# Patient Record
Sex: FEMALE | Race: WHITE
[De-identification: names, ages, dates, MRNs, and addresses within clinical notes are randomized per-mention and may not be internally consistent; named-entity substitution may affect disease eponyms.]

---

## 2019-04-03 ENCOUNTER — HOSPITAL ENCOUNTER (EMERGENCY)
Dept: HOSPITAL 10 - FTE | Age: 54
Discharge: HOME | End: 2019-04-03
Payer: SELF-PAY

## 2019-04-03 ENCOUNTER — HOSPITAL ENCOUNTER (EMERGENCY)
Dept: HOSPITAL 91 - FTE | Age: 54
Discharge: HOME | End: 2019-04-03
Payer: SELF-PAY

## 2019-04-03 VITALS
WEIGHT: 127.27 LBS | HEIGHT: 69 IN | HEIGHT: 69 IN | BODY MASS INDEX: 18.85 KG/M2 | BODY MASS INDEX: 18.85 KG/M2 | WEIGHT: 127.27 LBS

## 2019-04-03 VITALS — DIASTOLIC BLOOD PRESSURE: 64 MMHG | RESPIRATION RATE: 19 BRPM | HEART RATE: 86 BPM | SYSTOLIC BLOOD PRESSURE: 116 MMHG

## 2019-04-03 DIAGNOSIS — R51: Primary | ICD-10-CM

## 2019-04-03 PROCEDURE — 99282 EMERGENCY DEPT VISIT SF MDM: CPT

## 2019-04-03 NOTE — ERD
ER Documentation


Chief Complaint


Chief Complaint





body pains and migraine headaches





HPI


54-year-old female presents the emergency department complaining of chronic 


pain.


Patient as per her cures report demonstrates a significant evidence of narcotic 


and nonnarcotic controlled substance use.  Patient states she has fibromyalgia 


with chronic pain but reports no acute trauma or other acute symptoms.





ROS


All systems reviewed and are negative except as per history of present illness.





PMhx/Soc


Hx Alcohol Use:  No


Hx Substance Use:  No


Hx Tobacco Use:  No


Smoking Status:  Never smoker





Physical Exam


Vitals





Vital Signs


  Date      Temp  Pulse  Resp  B/P (MAP)   Pulse Ox  O2          O2 Flow    FiO2


Time                                                 Delivery    Rate


    4/3/19  98.6     86    19      116/64        99


     12:30                           (81)





Physical Exam


GENERAL: The patient is well developed and appropriate for usual state of health


in no apparent distress


HEENT: Pupils equal, round, and reactive to light.  EOMI. There is no scleral 


icterus.


NECK: C-spine is soft and supple, there is no meningismus.  There is no cervical


lymphadenopathy.


LUNGS: Clear to auscultation bilaterally. There are no rales, wheezes or 


rhonchi.


HEART: Regular rate and rhythm, no murmurs, clicks, rubs or gallops.


ABDOMEN: Soft, non-tender, non-distended.  There are bowel sounds in all four 


quadrants. No rebound or guarding.


EXTREMITIES: There is no peripheral cyanosis or edema.  No focal swelling or 


erythema.


NEURO: The patient moves all four extremities with 5/5 strength.  Cranial nerves


II - XII are intact. Normal gait. Alert and oriented


SKIN: There is no apparent rash or petechiae.


HEME/LYMPHATIC: There is no evidence of excessive bruising or lymphedema.


PSYCHIATRIC: Awake alert oriented.  She is intermittently tearful.  Normal 


thought process.





Procedures/MDM


Patient was taken to a room, seen and examined





Medical decision makin-year-old female presents with chronic pain issues.  


Medical screening examination shows no evidence of emergent medical condition.  


I have offered nonnarcotic treatment which she has declined.  Patient has no 


other indications for other acute findings or concerns and seems appropriate for


discharge.





Departure


Diagnosis:  


   Primary Impression:  


   Chronic pain


Condition:  Stable


Patient Instructions:  Chronic Pain











CHIVO MCGILL                 Apr 3, 2019 13:23

## 2019-04-06 ENCOUNTER — HOSPITAL ENCOUNTER (EMERGENCY)
Dept: HOSPITAL 10 - FTE | Age: 54
Discharge: HOME | End: 2019-04-06
Payer: MEDICAID

## 2019-04-06 ENCOUNTER — HOSPITAL ENCOUNTER (EMERGENCY)
Dept: HOSPITAL 91 - FTE | Age: 54
Discharge: HOME | End: 2019-04-06
Payer: MEDICAID

## 2019-04-06 VITALS — WEIGHT: 160.94 LBS | BODY MASS INDEX: 37.24 KG/M2 | HEIGHT: 55 IN

## 2019-04-06 VITALS — DIASTOLIC BLOOD PRESSURE: 79 MMHG | RESPIRATION RATE: 18 BRPM | SYSTOLIC BLOOD PRESSURE: 128 MMHG | HEART RATE: 79 BPM

## 2019-04-06 DIAGNOSIS — G43.909: Primary | ICD-10-CM

## 2019-04-06 PROCEDURE — 96375 TX/PRO/DX INJ NEW DRUG ADDON: CPT

## 2019-04-06 PROCEDURE — 81025 URINE PREGNANCY TEST: CPT

## 2019-04-06 PROCEDURE — 99284 EMERGENCY DEPT VISIT MOD MDM: CPT

## 2019-04-06 PROCEDURE — 96374 THER/PROPH/DIAG INJ IV PUSH: CPT

## 2019-04-06 RX ADMIN — KETOROLAC TROMETHAMINE 1 MG: 30 INJECTION, SOLUTION INTRAMUSCULAR at 15:31

## 2019-04-06 RX ADMIN — ONDANSETRON HYDROCHLORIDE 1 MG: 2 INJECTION, SOLUTION INTRAMUSCULAR; INTRAVENOUS at 15:31

## 2019-04-06 RX ADMIN — LIDOCAINE HYDROCHLORIDE 1 MLS/HR: 10 INJECTION, SOLUTION EPIDURAL; INFILTRATION; INTRACAUDAL; PERINEURAL at 15:31

## 2019-04-06 NOTE — ERD
ER Documentation


Chief Complaint


Chief Complaint





HA X 5 DAYS





HPI


54-year-old female, presents to the emergency department, complaining of 


worsening of throbbing headache for 5 days, associated with persistent nausea 


therefore, the patient reports a decrease in pain oral intake for fluids.  She 


is requesting IV fluids and a shot of Toradol.  Otherwise she denies blurred 


vision, no distal weakness, numbness or tingling.





ROS


All systems reviewed and are negative except as per history of present illness.





Medications


Home Meds


Active Scripts


Ondansetron Hcl* (Zofran*) 4 Mg Tablet, 4 MG PO Q8H PRN for NAUSEA AND/OR 


VOMITING, #12 TAB


   Prov:SKYLER CHANDRA MD         4/6/19


Sumatriptan Succinate* (Sumatriptan Succinate*) 25 Mg Tablet, 25 MG PO BID PRN 


for MIGRAINE HEADACHE, #9 TAB


   May repeat after 2 hours if needed;  mg/24 hours


   Prov:SKYLER CHANDRA MD         4/6/19


Acetaminophen-Butalbital-Caffeine-Codeine* (Fioricet w/Codeine*) 


210GA-36KX-59VD-30MG Cap, 1 CAP PO TID PRN for PAIN LEVEL 1-5, #12 CAP


   Prov:SKYLER CHANDRA MD         4/6/19





Allergies


Allergies:  


Coded Allergies:  


     No Known Allergy (Unverified , 4/6/19)





PMhx/Soc


Medical and Surgical Hx:  pt denies Medical Hx


History of Surgery:  No


Anesthesia Reaction:  No


Hx Neurological Disorder:  No


Hx Respiratory Disorders:  No


Hx Cardiac Disorders:  No


Hx Psychiatric Problems:  No


Hx Miscellaneous Medical Probl:  No


Hx Alcohol Use:  No


Hx Substance Use:  No


Hx Tobacco Use:  No





Physical Exam


Vitals





Vital Signs


  Date      Temp  Pulse  Resp  B/P (MAP)   Pulse Ox  O2          O2 Flow    FiO2


Time                                                 Delivery    Rate


    4/6/19  98.1     79    18      128/79        99


     13:35                           (95)





Physical Exam


Const:   No acute distress


Head:   Atraumatic 


Eyes:    Normal Conjunctiva


ENT:    Normal External Ears, Nose and Mouth.


Neck:               Full range of motion. No meningismus.


Resp:   Clear to auscultation bilaterally


Cardio:   Regular rate and rhythm, no murmurs


Abd:    Soft, non tender, non distended. Normal bowel sounds


Skin:   No petechiae or rashes


Back:   No midline or flank tenderness


Ext:    No cyanosis, or edema


Neur:   Awake and alert


Psych:    Normal Mood and Affect


Results 24 hrs





Laboratory Tests


                    Test
                      4/6/19
15:28


                    POC Beta HCG, Qualitative  NEGATIVE





Current Medications


 Medications
   Dose
          Sig/Naima
       Start Time
   Status  Last


 (Trade)       Ordered        Route
 PRN     Stop Time              Admin
Dose


                              Reason                                Admin


 Ketorolac
     30 mg          ONCE  STAT
    4/6/19        DC            4/6/19


Tromethamine
                 IV
            15:10
 4/6/19                15:31



 (Toradol)                                   15:22


 Ondansetron    4 mg           ONCE  STAT
    4/6/19        DC            4/6/19


HCl
  (Zofran                 IV
            15:10
 4/6/19                15:31



Inj)                                         15:22


 Sodium         500 ml @ 
     Q1H ONCE
      4/6/19        DC            4/6/19


Chloride       500 mls/hr     IV
            15:30
 4/6/19                15:31



                                             16:28








Procedures/MDM


Vital signs stable, Physical exam unremarkable, neurovascular exam intact.


Differential diagnosis include but not limited to: Classical migraine, 


sinusitis, visual corrective problems, side effects of medications, dehydration,


electrolyte imbalance, endocrine/autoimmune medical condition, stress, anxiety, 


tension headache.  Low suspicion for meningitis, CNS tumor, cerebrovascular 


event.


Physical examination and clinical presentation consistent most likely with ten


javid headache.


During the ED course the patient remained stable, no new complaints.   The 


patient received treatment with Toradol IV presenting overall improvement of the


symptoms.


Results and clinical impression discussed with patient who agrees with larry barrett. The patient is stable to be treated outpatient and will be discharged 


home, some side effects of prescribed medications (headache, rash, nausea, 


vomiting, diarrhea, drowsiness, habituation, bleeding, hypertension, 


interactions with other medications) were reviewed.


Follow up with the primary care provider in the next 48h has been recommended.  


If symptoms persist, worsen or new symptoms develop, then patient should return 


to the ED immediately.





Instructions explained and given directly by me to the patient  with 


acknowledgment and demonstrated understanding.





Disclaimer: Inadvertent spelling and grammatical errors are likely due to 


EHR/dictation software use and do not reflect on the overall quality of patient 


care. Also, please note that the electronic time recorded on this note does not 


necessarily reflect the actual time of the patient encounter.





Departure


Diagnosis:  


   Primary Impression:  


   Migraine headache


Condition:  Stable





Additional Instructions:  


Thank you very much for allowing us to participate in your care. 


Your health and safety is our top priority at [facility].





Call your primary care doctor TOMORROW for an appointment during the next 2-4 


days and bring all the information and medications prescribed. 





Have prescriptions filled and follow precisely the directions on the label. 





If the symptoms get worse and your provider is unavailable, return to the 


Emergency Department immediately.











SKYLER CHANDRA MD       Apr 6, 2019 15:12

## 2019-08-12 ENCOUNTER — HOSPITAL ENCOUNTER (EMERGENCY)
Dept: HOSPITAL 91 - FTE | Age: 54
Discharge: HOME | End: 2019-08-12
Payer: COMMERCIAL

## 2019-08-12 ENCOUNTER — HOSPITAL ENCOUNTER (EMERGENCY)
Dept: HOSPITAL 10 - FTE | Age: 54
Discharge: HOME | End: 2019-08-12
Payer: MEDICAID

## 2019-08-12 VITALS — HEART RATE: 77 BPM | DIASTOLIC BLOOD PRESSURE: 78 MMHG | SYSTOLIC BLOOD PRESSURE: 118 MMHG | RESPIRATION RATE: 18 BRPM

## 2019-08-12 VITALS
WEIGHT: 118.61 LBS | HEIGHT: 66 IN | BODY MASS INDEX: 19.06 KG/M2 | WEIGHT: 118.61 LBS | BODY MASS INDEX: 19.06 KG/M2 | HEIGHT: 66 IN

## 2019-08-12 DIAGNOSIS — G47.00: ICD-10-CM

## 2019-08-12 DIAGNOSIS — F41.9: Primary | ICD-10-CM

## 2019-08-12 PROCEDURE — 99284 EMERGENCY DEPT VISIT MOD MDM: CPT

## 2019-08-12 PROCEDURE — 96372 THER/PROPH/DIAG INJ SC/IM: CPT

## 2019-08-12 RX ADMIN — LORAZEPAM 1 MG: 2 INJECTION, SOLUTION INTRAMUSCULAR; INTRAVENOUS at 18:03

## 2023-07-23 ENCOUNTER — HOSPITAL ENCOUNTER (EMERGENCY)
Dept: HOSPITAL 54 - ER | Age: 58
Discharge: HOME | End: 2023-07-23
Payer: COMMERCIAL

## 2023-07-23 VITALS — WEIGHT: 127 LBS | HEIGHT: 69 IN | BODY MASS INDEX: 18.81 KG/M2

## 2023-07-23 VITALS — DIASTOLIC BLOOD PRESSURE: 57 MMHG | OXYGEN SATURATION: 98 % | SYSTOLIC BLOOD PRESSURE: 92 MMHG | TEMPERATURE: 97.7 F

## 2023-07-23 DIAGNOSIS — K59.00: Primary | ICD-10-CM

## 2023-07-23 DIAGNOSIS — Z79.899: ICD-10-CM

## 2023-07-23 LAB
ALBUMIN SERPL BCP-MCNC: 3.8 G/DL (ref 3.4–5)
ALP SERPL-CCNC: 77 U/L (ref 46–116)
ALT SERPL W P-5'-P-CCNC: 36 U/L (ref 12–78)
AST SERPL W P-5'-P-CCNC: 38 U/L (ref 15–37)
BASOPHILS # BLD AUTO: 0.1 K/UL (ref 0–0.2)
BASOPHILS NFR BLD AUTO: 1.6 % (ref 0–2)
BILIRUB DIRECT SERPL-MCNC: 0.1 MG/DL (ref 0–0.2)
BILIRUB SERPL-MCNC: 0.2 MG/DL (ref 0.2–1)
BILIRUB UR QL STRIP: NEGATIVE
BUN SERPL-MCNC: 20 MG/DL (ref 7–18)
CALCIUM SERPL-MCNC: 8.9 MG/DL (ref 8.5–10.1)
CHLORIDE SERPL-SCNC: 104 MMOL/L (ref 98–107)
CO2 SERPL-SCNC: 26 MMOL/L (ref 21–32)
COLOR UR: YELLOW
CREAT SERPL-MCNC: 0.9 MG/DL (ref 0.6–1.3)
DEPRECATED SQUAMOUS URNS QL MICRO: (no result) /HPF
EOSINOPHIL NFR BLD AUTO: 0.6 % (ref 0–6)
GLUCOSE SERPL-MCNC: 93 MG/DL (ref 74–106)
GLUCOSE UR STRIP-MCNC: NEGATIVE MG/DL
HCT VFR BLD AUTO: 37 % (ref 33–45)
HGB BLD-MCNC: 12.1 G/DL (ref 11.5–14.8)
LEUKOCYTE ESTERASE UR QL STRIP: (no result)
LIPASE SERPL-CCNC: 81 U/L (ref 73–393)
LYMPHOCYTES NFR BLD AUTO: 1.8 K/UL (ref 0.8–4.8)
LYMPHOCYTES NFR BLD AUTO: 46.5 % (ref 20–44)
MCHC RBC AUTO-ENTMCNC: 33 G/DL (ref 31–36)
MCV RBC AUTO: 94 FL (ref 82–100)
MONOCYTES NFR BLD AUTO: 0.4 K/UL (ref 0.1–1.3)
MONOCYTES NFR BLD AUTO: 9.8 % (ref 2–12)
NEUTROPHILS # BLD AUTO: 1.6 K/UL (ref 1.8–8.9)
NEUTROPHILS NFR BLD AUTO: 41.5 % (ref 43–81)
NITRITE UR QL STRIP: POSITIVE
PH UR STRIP: 6.5 [PH] (ref 5–8)
PLATELET # BLD AUTO: 195 K/UL (ref 150–450)
POTASSIUM SERPL-SCNC: 3.9 MMOL/L (ref 3.5–5.1)
PROT SERPL-MCNC: 6.7 G/DL (ref 6.4–8.2)
PROT UR QL STRIP: (no result) MG/DL
RBC # BLD AUTO: 3.95 MIL/UL (ref 4–5.2)
RBC #/AREA URNS HPF: (no result) /HPF (ref 0–2)
SODIUM SERPL-SCNC: 140 MMOL/L (ref 136–145)
UROBILINOGEN UR STRIP-MCNC: 0.2 EU/DL
WBC #/AREA URNS HPF: (no result) /HPF
WBC NRBC COR # BLD AUTO: 3.8 K/UL (ref 4.3–11)

## 2023-07-23 PROCEDURE — 99285 EMERGENCY DEPT VISIT HI MDM: CPT

## 2023-07-23 PROCEDURE — 74177 CT ABD & PELVIS W/CONTRAST: CPT

## 2023-07-23 PROCEDURE — 36415 COLL VENOUS BLD VENIPUNCTURE: CPT

## 2023-07-23 PROCEDURE — 80076 HEPATIC FUNCTION PANEL: CPT

## 2023-07-23 PROCEDURE — 96374 THER/PROPH/DIAG INJ IV PUSH: CPT

## 2023-07-23 PROCEDURE — 87086 URINE CULTURE/COLONY COUNT: CPT

## 2023-07-23 PROCEDURE — 81001 URINALYSIS AUTO W/SCOPE: CPT

## 2023-07-23 PROCEDURE — 85025 COMPLETE CBC W/AUTO DIFF WBC: CPT

## 2023-07-23 PROCEDURE — 96361 HYDRATE IV INFUSION ADD-ON: CPT

## 2023-07-23 PROCEDURE — 96372 THER/PROPH/DIAG INJ SC/IM: CPT

## 2023-07-23 PROCEDURE — 80048 BASIC METABOLIC PNL TOTAL CA: CPT

## 2023-07-23 PROCEDURE — 83690 ASSAY OF LIPASE: CPT
